# Patient Record
Sex: MALE | Race: WHITE | Employment: OTHER | URBAN - METROPOLITAN AREA
[De-identification: names, ages, dates, MRNs, and addresses within clinical notes are randomized per-mention and may not be internally consistent; named-entity substitution may affect disease eponyms.]

---

## 2022-02-08 ENCOUNTER — OFFICE VISIT (OUTPATIENT)
Dept: SURGERY | Facility: CLINIC | Age: 62
End: 2022-02-08
Payer: COMMERCIAL

## 2022-02-08 VITALS — TEMPERATURE: 97 F

## 2022-02-08 DIAGNOSIS — K64.2 PROLAPSED INTERNAL HEMORRHOIDS, GRADE 3: ICD-10-CM

## 2022-02-08 DIAGNOSIS — L29.0 PRURITUS ANI: ICD-10-CM

## 2022-02-08 DIAGNOSIS — K60.1 CHRONIC POSTERIOR ANAL FISSURE: Primary | ICD-10-CM

## 2022-02-08 PROCEDURE — 99203 OFFICE O/P NEW LOW 30 MIN: CPT | Performed by: COLON & RECTAL SURGERY

## 2022-02-09 NOTE — PATIENT INSTRUCTIONS
This patient complains of anal pain, has been diagnosed with an anal fissure by other physicians, and is here seeking an opinion. I am seeing him in the role of a colon and rectal surgeon. The patient states he was on a fishing trip last summer, had a very large constipated bowel movement, felt the tear in the anus. He did not correlate this with the event of the anal fissure. He is quoted that as a episode of hemorrhoids in July 2021. The patient has had severe symptoms since October 2021. He has burning, itching, feels a ripping sensation. He gets pain that is 5/10 with every bowel movement. He even has pain sometimes beyond the time of defecation. He feels no obstruction defecation. He has 2-3 soft bowel movements per day. He does take Metamucil twice daily. He states when the pain is bad it radiates out to his left buttock and hip. The patient did undergo colonoscopy on December 14, 2021. He was noted to have internal hemorrhoids, and a fissure. He also brought pictures that show that he has hypertrophied anal papillae as well. The hemorrhoids appear to me to be grade 3. The patient has no current significant bright red blood per rectum or melena. He has no mucus in the stool. He has no narrowing of the stool. He has no abdominal pain or distention. He has not suffered weight loss as a symptom. He himself has never had polyps or colon cancer. He has no family history of colon cancer. He has never had a previous anal or rectal abscess, anal fistula, or prior surgery for hemorrhoids. The patient has no history of Crohn disease, ulcerative colitis, or irritable bowel syndrome. The patient is not on any blood thinners, or aspirin. He has never had heart attack, stroke, heart valve replacement, heart murmur, or cardiac arrhythmia. The previous surgeon started him on diltiazem mixed with lidocaine, with no success and healing the fissure.     The previous surgeon was recommending a lateral internal sphincterotomy. Clinical exam today limited to digital rectal exam and external exam reveals him to have a posterior midline anal fissure that has a deep punched out ulcer exposing internal sphincter muscle. There is an associated external tag, and a palpable internal hypertrophied anal papillae. There is significant pruritus ani present. Prostate is normal.  Anal tone reveals a basal tone of 3/4, maximum squeeze pressure 3/4. The patient was instructed on a pruritus ani diet. This will help repair the skin. I went over the diet, as well as other hygienic modifications that will help improve the skin and his itching sensation. We will change him from diltiazem ointment to a nitroglycerin ointment. This needs to be applied 3 times daily externally. Hopefully the combination of the above will cure his very deep punched out posterior midline anal fissure. I will see him again in 1 month. I mentioned to the patient that sometimes skin cancers can mimic fissure. Although his fissure is in a typical location, and looks very typical, I must see it healing prior to signing off his case.

## 2022-03-08 ENCOUNTER — OFFICE VISIT (OUTPATIENT)
Dept: SURGERY | Facility: CLINIC | Age: 62
End: 2022-03-08
Payer: COMMERCIAL

## 2022-03-08 DIAGNOSIS — L29.0 PRURITUS ANI: ICD-10-CM

## 2022-03-08 DIAGNOSIS — K60.0 ACUTE POSTERIOR ANAL FISSURE: ICD-10-CM

## 2022-03-08 DIAGNOSIS — K64.2 PROLAPSED INTERNAL HEMORRHOIDS, GRADE 3: Primary | ICD-10-CM

## 2022-03-08 PROCEDURE — 99214 OFFICE O/P EST MOD 30 MIN: CPT | Performed by: COLON & RECTAL SURGERY

## 2022-03-08 NOTE — PATIENT INSTRUCTIONS
I am seeing this patient in consultation regarding a fissure. We prescribed him nitroglycerin ointment in February. He states the medicine burns, it has had no effect on his fissure. Patient also had pruritus ani. We gave him dietary and hygienic modifications to improve his pruritus symptoms. He states that worked very well. He wants to go back on coffee. The patient states that his fissure kills him every day. He feels like he is being stabbed with a knife in the anus during bowel movements. The patient also mentioned that despite taking Metamucil 3 times daily, he still having a hard stool that is difficult to eliminate. I have advised him to take 1 adult dose of MiraLAX daily. Clinical exam limited to external exam alone reveals him to have a posterior midline anal fissure with an associated anal skin tag. There are some other minimal external hemorrhoids. The pruritus has cleared. There are no signs of fistula or abscess. This patient is up-to-date with colonoscopy within the last year by Dr. Jocelyn Ventura, in [place]. We will be performing a subcutaneous lateral internal sphincterotomy for treatment of his posterior midline anal fissure. I had a long conversation with the patient and his wife regarding the procedure. All risks, benefits, complications and alternatives to the proposed procedure(s) were fully discussed with the patient. All questions from the patient were answered in detail. A description of the procedure(s) and possible outcomes was fully discussed. The patient seemed to understand the conversation and its details. Consent for the procedure(s) was confirmed with the patient.

## 2022-03-14 ENCOUNTER — TELEPHONE (OUTPATIENT)
Dept: SURGERY | Facility: CLINIC | Age: 62
End: 2022-03-14

## 2022-03-15 RX ORDER — ACETAMINOPHEN 325 MG/1
325 TABLET ORAL EVERY 6 HOURS PRN
COMMUNITY
End: 2022-04-04

## 2022-03-15 RX ORDER — FEXOFENADINE HCL 180 MG/1
180 TABLET ORAL DAILY
COMMUNITY
End: 2022-04-04

## 2022-03-15 RX ORDER — CHLORTHALIDONE 25 MG/1
25 TABLET ORAL DAILY
COMMUNITY

## 2022-03-15 RX ORDER — LEVOCETIRIZINE DIHYDROCHLORIDE 5 MG/1
5 TABLET, FILM COATED ORAL EVERY EVENING
COMMUNITY

## 2022-03-15 RX ORDER — MONTELUKAST SODIUM 10 MG/1
10 TABLET ORAL DAILY
COMMUNITY

## 2022-03-16 ENCOUNTER — LABORATORY ENCOUNTER (OUTPATIENT)
Dept: LAB | Age: 62
End: 2022-03-16
Attending: COLON & RECTAL SURGERY
Payer: COMMERCIAL

## 2022-03-16 ENCOUNTER — EKG ENCOUNTER (OUTPATIENT)
Dept: LAB | Age: 62
End: 2022-03-16
Attending: COLON & RECTAL SURGERY
Payer: COMMERCIAL

## 2022-03-16 DIAGNOSIS — Z01.812 ENCOUNTER FOR PREOPERATIVE SCREENING LABORATORY TESTING FOR COVID-19 VIRUS: ICD-10-CM

## 2022-03-16 DIAGNOSIS — K60.2 ANAL FISSURE: ICD-10-CM

## 2022-03-16 DIAGNOSIS — Z20.822 ENCOUNTER FOR PREOPERATIVE SCREENING LABORATORY TESTING FOR COVID-19 VIRUS: ICD-10-CM

## 2022-03-16 LAB
BUN BLD-MCNC: 19 MG/DL (ref 7–18)
CALCIUM BLD-MCNC: 9.6 MG/DL (ref 8.5–10.1)
CHLORIDE SERPL-SCNC: 102 MMOL/L (ref 98–112)
CO2 SERPL-SCNC: 28 MMOL/L (ref 21–32)
CREAT BLD-MCNC: 1.02 MG/DL
FASTING STATUS PATIENT QL REPORTED: NO
GLUCOSE BLD-MCNC: 96 MG/DL (ref 70–99)
OSMOLALITY SERPL CALC.SUM OF ELEC: 288 MOSM/KG (ref 275–295)
POTASSIUM SERPL-SCNC: 3.6 MMOL/L (ref 3.5–5.1)
SODIUM SERPL-SCNC: 138 MMOL/L (ref 136–145)

## 2022-03-16 PROCEDURE — 80048 BASIC METABOLIC PNL TOTAL CA: CPT

## 2022-03-16 PROCEDURE — 93010 ELECTROCARDIOGRAM REPORT: CPT | Performed by: INTERNAL MEDICINE

## 2022-03-16 PROCEDURE — 93005 ELECTROCARDIOGRAM TRACING: CPT

## 2022-03-17 LAB — SARS-COV-2 RNA RESP QL NAA+PROBE: NOT DETECTED

## 2022-03-18 ENCOUNTER — HOSPITAL ENCOUNTER (OUTPATIENT)
Facility: HOSPITAL | Age: 62
Setting detail: HOSPITAL OUTPATIENT SURGERY
Discharge: HOME | End: 2022-03-18
Attending: COLON & RECTAL SURGERY | Admitting: COLON & RECTAL SURGERY
Payer: COMMERCIAL

## 2022-03-18 ENCOUNTER — ANESTHESIA EVENT (OUTPATIENT)
Dept: SURGERY | Facility: HOSPITAL | Age: 62
End: 2022-03-18
Payer: COMMERCIAL

## 2022-03-18 ENCOUNTER — ANESTHESIA (OUTPATIENT)
Dept: SURGERY | Facility: HOSPITAL | Age: 62
End: 2022-03-18
Payer: COMMERCIAL

## 2022-03-18 VITALS
TEMPERATURE: 98 F | OXYGEN SATURATION: 97 % | BODY MASS INDEX: 27.19 KG/M2 | DIASTOLIC BLOOD PRESSURE: 73 MMHG | HEART RATE: 91 BPM | HEIGHT: 71 IN | RESPIRATION RATE: 18 BRPM | SYSTOLIC BLOOD PRESSURE: 125 MMHG | WEIGHT: 194.25 LBS

## 2022-03-18 DIAGNOSIS — K60.0 ACUTE POSTERIOR ANAL FISSURE: ICD-10-CM

## 2022-03-18 DIAGNOSIS — Z20.822 ENCOUNTER FOR PREOPERATIVE SCREENING LABORATORY TESTING FOR COVID-19 VIRUS: Primary | ICD-10-CM

## 2022-03-18 DIAGNOSIS — Z01.812 ENCOUNTER FOR PREOPERATIVE SCREENING LABORATORY TESTING FOR COVID-19 VIRUS: Primary | ICD-10-CM

## 2022-03-18 DIAGNOSIS — K60.2 ANAL FISSURE: ICD-10-CM

## 2022-03-18 PROCEDURE — 88305 TISSUE EXAM BY PATHOLOGIST: CPT | Performed by: COLON & RECTAL SURGERY

## 2022-03-18 PROCEDURE — 0DBQ7ZX EXCISION OF ANUS, VIA NATURAL OR ARTIFICIAL OPENING, DIAGNOSTIC: ICD-10-PCS | Performed by: COLON & RECTAL SURGERY

## 2022-03-18 RX ORDER — ACETAMINOPHEN 500 MG
1000 TABLET ORAL ONCE AS NEEDED
Status: CANCELLED | OUTPATIENT
Start: 2022-03-18 | End: 2022-03-18

## 2022-03-18 RX ORDER — SODIUM CHLORIDE, SODIUM LACTATE, POTASSIUM CHLORIDE, CALCIUM CHLORIDE 600; 310; 30; 20 MG/100ML; MG/100ML; MG/100ML; MG/100ML
INJECTION, SOLUTION INTRAVENOUS CONTINUOUS
Status: DISCONTINUED | OUTPATIENT
Start: 2022-03-18 | End: 2022-03-18

## 2022-03-18 RX ORDER — DEXAMETHASONE SODIUM PHOSPHATE 4 MG/ML
VIAL (ML) INJECTION AS NEEDED
Status: DISCONTINUED | OUTPATIENT
Start: 2022-03-18 | End: 2022-03-18 | Stop reason: SURG

## 2022-03-18 RX ORDER — 0.9 % SODIUM CHLORIDE 0.9 %
INTRAVENOUS SOLUTION INTRAVENOUS
Status: DISCONTINUED
Start: 2022-03-18 | End: 2022-03-18

## 2022-03-18 RX ORDER — HYDROCODONE BITARTRATE AND ACETAMINOPHEN 5; 325 MG/1; MG/1
2 TABLET ORAL AS NEEDED
Status: DISCONTINUED | OUTPATIENT
Start: 2022-03-18 | End: 2022-03-18

## 2022-03-18 RX ORDER — HYDROCODONE BITARTRATE AND ACETAMINOPHEN 5; 325 MG/1; MG/1
1 TABLET ORAL AS NEEDED
Status: DISCONTINUED | OUTPATIENT
Start: 2022-03-18 | End: 2022-03-18

## 2022-03-18 RX ORDER — SODIUM CHLORIDE, SODIUM LACTATE, POTASSIUM CHLORIDE, CALCIUM CHLORIDE 600; 310; 30; 20 MG/100ML; MG/100ML; MG/100ML; MG/100ML
INJECTION, SOLUTION INTRAVENOUS CONTINUOUS
Status: CANCELLED | OUTPATIENT
Start: 2022-03-18

## 2022-03-18 RX ORDER — HYDROCODONE BITARTRATE AND ACETAMINOPHEN 5; 325 MG/1; MG/1
2 TABLET ORAL AS NEEDED
Status: CANCELLED | OUTPATIENT
Start: 2022-03-18

## 2022-03-18 RX ORDER — MEPERIDINE HYDROCHLORIDE 25 MG/ML
12.5 INJECTION INTRAMUSCULAR; INTRAVENOUS; SUBCUTANEOUS AS NEEDED
Status: CANCELLED | OUTPATIENT
Start: 2022-03-18

## 2022-03-18 RX ORDER — HYDROMORPHONE HYDROCHLORIDE 1 MG/ML
0.4 INJECTION, SOLUTION INTRAMUSCULAR; INTRAVENOUS; SUBCUTANEOUS EVERY 5 MIN PRN
Status: DISCONTINUED | OUTPATIENT
Start: 2022-03-18 | End: 2022-03-18

## 2022-03-18 RX ORDER — HYDROCODONE BITARTRATE AND ACETAMINOPHEN 5; 325 MG/1; MG/1
1 TABLET ORAL EVERY 6 HOURS PRN
Qty: 10 TABLET | Refills: 0 | Status: SHIPPED | OUTPATIENT
Start: 2022-03-18 | End: 2022-04-04

## 2022-03-18 RX ORDER — HEPARIN SODIUM 5000 [USP'U]/ML
INJECTION, SOLUTION INTRAVENOUS; SUBCUTANEOUS
Status: DISCONTINUED
Start: 2022-03-18 | End: 2022-03-18

## 2022-03-18 RX ORDER — ACETAMINOPHEN 500 MG
1000 TABLET ORAL ONCE
Status: DISCONTINUED | OUTPATIENT
Start: 2022-03-18 | End: 2022-03-18 | Stop reason: HOSPADM

## 2022-03-18 RX ORDER — ONDANSETRON 2 MG/ML
4 INJECTION INTRAMUSCULAR; INTRAVENOUS AS NEEDED
Status: DISCONTINUED | OUTPATIENT
Start: 2022-03-18 | End: 2022-03-18

## 2022-03-18 RX ORDER — ONDANSETRON 2 MG/ML
4 INJECTION INTRAMUSCULAR; INTRAVENOUS AS NEEDED
Status: CANCELLED | OUTPATIENT
Start: 2022-03-18 | End: 2022-03-19

## 2022-03-18 RX ORDER — NALOXONE HYDROCHLORIDE 0.4 MG/ML
80 INJECTION, SOLUTION INTRAMUSCULAR; INTRAVENOUS; SUBCUTANEOUS AS NEEDED
Status: DISCONTINUED | OUTPATIENT
Start: 2022-03-18 | End: 2022-03-18

## 2022-03-18 RX ORDER — HEPARIN SODIUM 5000 [USP'U]/ML
5000 INJECTION, SOLUTION INTRAVENOUS; SUBCUTANEOUS ONCE
Status: COMPLETED | OUTPATIENT
Start: 2022-03-18 | End: 2022-03-18

## 2022-03-18 RX ORDER — BUPIVACAINE HYDROCHLORIDE AND EPINEPHRINE 5; 5 MG/ML; UG/ML
INJECTION, SOLUTION EPIDURAL; INTRACAUDAL; PERINEURAL AS NEEDED
Status: DISCONTINUED | OUTPATIENT
Start: 2022-03-18 | End: 2022-03-18 | Stop reason: HOSPADM

## 2022-03-18 RX ORDER — CEFOXITIN 2 G/1
INJECTION, POWDER, FOR SOLUTION INTRAVENOUS
Status: DISCONTINUED
Start: 2022-03-18 | End: 2022-03-18

## 2022-03-18 RX ORDER — METOCLOPRAMIDE HYDROCHLORIDE 5 MG/ML
10 INJECTION INTRAMUSCULAR; INTRAVENOUS AS NEEDED
Status: CANCELLED | OUTPATIENT
Start: 2022-03-18 | End: 2022-03-19

## 2022-03-18 RX ORDER — DIPHENHYDRAMINE HYDROCHLORIDE 50 MG/ML
12.5 INJECTION INTRAMUSCULAR; INTRAVENOUS AS NEEDED
Status: CANCELLED | OUTPATIENT
Start: 2022-03-18 | End: 2022-03-19

## 2022-03-18 RX ORDER — HYDROMORPHONE HYDROCHLORIDE 1 MG/ML
0.4 INJECTION, SOLUTION INTRAMUSCULAR; INTRAVENOUS; SUBCUTANEOUS EVERY 5 MIN PRN
Status: CANCELLED | OUTPATIENT
Start: 2022-03-18 | End: 2022-03-19

## 2022-03-18 RX ORDER — NALOXONE HYDROCHLORIDE 0.4 MG/ML
80 INJECTION, SOLUTION INTRAMUSCULAR; INTRAVENOUS; SUBCUTANEOUS AS NEEDED
Status: CANCELLED | OUTPATIENT
Start: 2022-03-18 | End: 2022-03-19

## 2022-03-18 RX ORDER — MIDAZOLAM HYDROCHLORIDE 1 MG/ML
1 INJECTION INTRAMUSCULAR; INTRAVENOUS EVERY 5 MIN PRN
Status: CANCELLED | OUTPATIENT
Start: 2022-03-18 | End: 2022-03-19

## 2022-03-18 RX ORDER — HYDROCODONE BITARTRATE AND ACETAMINOPHEN 5; 325 MG/1; MG/1
1 TABLET ORAL AS NEEDED
Status: CANCELLED | OUTPATIENT
Start: 2022-03-18

## 2022-03-18 RX ORDER — LIDOCAINE HYDROCHLORIDE 10 MG/ML
INJECTION, SOLUTION EPIDURAL; INFILTRATION; INTRACAUDAL; PERINEURAL AS NEEDED
Status: DISCONTINUED | OUTPATIENT
Start: 2022-03-18 | End: 2022-03-18 | Stop reason: SURG

## 2022-03-18 RX ORDER — ONDANSETRON 2 MG/ML
INJECTION INTRAMUSCULAR; INTRAVENOUS AS NEEDED
Status: DISCONTINUED | OUTPATIENT
Start: 2022-03-18 | End: 2022-03-18 | Stop reason: SURG

## 2022-03-18 RX ADMIN — SODIUM CHLORIDE, SODIUM LACTATE, POTASSIUM CHLORIDE, CALCIUM CHLORIDE: 600; 310; 30; 20 INJECTION, SOLUTION INTRAVENOUS at 19:16:00

## 2022-03-18 RX ADMIN — DEXAMETHASONE SODIUM PHOSPHATE 4 MG: 4 MG/ML VIAL (ML) INJECTION at 18:45:00

## 2022-03-18 RX ADMIN — ONDANSETRON 4 MG: 2 INJECTION INTRAMUSCULAR; INTRAVENOUS at 18:45:00

## 2022-03-18 RX ADMIN — SODIUM CHLORIDE, SODIUM LACTATE, POTASSIUM CHLORIDE, CALCIUM CHLORIDE: 600; 310; 30; 20 INJECTION, SOLUTION INTRAVENOUS at 18:45:00

## 2022-03-18 RX ADMIN — LIDOCAINE HYDROCHLORIDE 50 MG: 10 INJECTION, SOLUTION EPIDURAL; INFILTRATION; INTRACAUDAL; PERINEURAL at 18:45:00

## 2022-03-18 NOTE — ANESTHESIA PROCEDURE NOTES
Airway  Date/Time: 3/18/2022 6:46 PM  Urgency: elective      General Information and Staff    Patient location during procedure: OR  Anesthesiologist: Ragini Rubi MD  Performed: anesthesiologist     Indications and Patient Condition  Indications for airway management: anesthesia  Sedation level: deep  Preoxygenated: yes  Patient position: sniffing  Mask difficulty assessment: 1 - vent by mask    Final Airway Details  Final airway type: endotracheal airway      Successful airway: ETT  Cuffed: yes   Successful intubation technique: direct laryngoscopy  Endotracheal tube insertion site: oral  Blade: Magana  Blade size: #2  ETT size (mm): 7.0    Cormack-Lehane Classification: grade IIA - partial view of glottis  Placement verified by: chest auscultation and capnometry   Measured from: lips  Number of attempts at approach: 1

## 2022-03-19 NOTE — OPERATIVE REPORT
[facility]  Op Note    Ben Fan Location: OR   [facility] [id number] MRN [id number]   Admission Date 3/18/2022 Operation Date 3/18/2022   Attending Physician Mayank Baker MD Operating Physician Nehal Traylor MD     Pre-Operative Diagnosis: Acute posterior anal fissure [K60.0]    Post-Operative Diagnosis: Same as above    Procedure Performed: Subcutaneous Lateral Internal Sphincterototmy, transanal polypectomy    Surgeon(s) and Role:     Eulis Hodgkins, MD - Primary    Anesthesia: General    History of present illness: This patient has a chronic posterior midline anal fissure that puts him in acute distress. Operative findings: This patient has a very large anal polyp associated with the fissure in the posterior midline. He has some small tags externally, however the polyp is greater than 1 cm. We did resect that and sent to pathology for for this pathologic diagnosis. The patient underwent an uncomplicated subcutaneous lateral internal sphincterotomy. Operative summary:   Following adequate general anesthesia, the patient was placed in the prone position on the operating room table. The hips were flexed in the jackknife position the buttocks were taped apart. The perineum was painted with Betadine paint. Sterile drapes were placed with wide exposure of the anal canal.    We began the procedure by confirming the above listed operative findings. Perianal skin was injected with 0.5% Marcaine with epinephrine. The deep sphincter complex was also injected. A Chowdhury Melodie anoscope was inserted into the anal canal and used to expose the anal fissure. We then turned the anoscope to expose the left lateral aspect of the anal canal. Using an 11 blade scalpel, a small incision was made at the level of the anoderm to cutaneous junction. The blade was passed, flat skin and the subcutaneous tissues.  The blade was then turned toward the internal sphincter and used to divide only the internal sphincter. The blade was withdrawn, pressure was held on the site for approximately 3 minutes. A 3-0 Vicryl suture was used to control further hemorrhage and to close the incision    Once hemostasis was assured, and all counts were correct, we concluded the procedure. The patient was returned to the supine position and taken to recovery room in stable postoperative condition.         Pathologic Specimen: None        EBL: 5 cc               Zaira Araiza MD  3/18/2022  7:21 PM

## 2022-03-19 NOTE — ANESTHESIA POSTPROCEDURE EVALUATION
[address] [facility]:  [facility]   Age/Gender 57year old male MRN [id number]   [facility] SURGERY Attending Marques Mayorga MD   Hosp Day # 0 [facility]       Anesthesia Post-op Note    SUBCUTANEOUS LATERAL INTERNAL SPHINCTEROTOMY, TRANSANAL POLYPECTOMY     Procedure Summary     Date: 03/18/22 Room / Location: [facility] MAIN OR [address] [place] [address] MAIN OR    Anesthesia Start: 1843 Anesthesia Stop: 1924    Procedure: SUBCUTANEOUS LATERAL INTERNAL SPHINCTEROTOMY, TRANSANAL POLYPECTOMY (N/A Anus) Diagnosis:       Acute posterior anal fissure      (Acute posterior anal fissure, ANAL POLYP[K60.0])    Surgeons: Marques Mayorga MD Anesthesiologist: Samaria Watters MD    Anesthesia Type: general ASA Status: 2          Anesthesia Type: general    Vitals Value Taken Time   /77 03/18/22 1924   Temp 97.4 03/18/22 1924   Pulse 82 03/18/22 1924   Resp 16 03/18/22 1924   SpO2 98 03/18/22 1924       Patient Location: PACU    Anesthesia Type: general    Airway Patency: extubated    Postop Pain Control: adequate    Mental Status: preanesthetic baseline    Nausea/Vomiting: none    Cardiopulmonary/Hydration status: stable euvolemic    Complications: no apparent anesthesia related complications    Postop vital signs: stable    Dental Exam: Unchanged from Preop    Patient to be discharged from PACU when criteria met.

## 2022-03-29 LAB
ATRIAL RATE: 71 BPM
P AXIS: 61 DEGREES
P-R INTERVAL: 250 MS
Q-T INTERVAL: 402 MS
QRS DURATION: 90 MS
QTC CALCULATION (BEZET): 436 MS
R AXIS: 10 DEGREES
T AXIS: 15 DEGREES
VENTRICULAR RATE: 71 BPM

## 2022-04-04 ENCOUNTER — OFFICE VISIT (OUTPATIENT)
Dept: SURGERY | Facility: CLINIC | Age: 62
End: 2022-04-04

## 2022-04-04 VITALS — DIASTOLIC BLOOD PRESSURE: 72 MMHG | SYSTOLIC BLOOD PRESSURE: 113 MMHG | HEART RATE: 73 BPM | TEMPERATURE: 97 F

## 2022-04-04 DIAGNOSIS — K60.0 ACUTE POSTERIOR ANAL FISSURE: Primary | ICD-10-CM

## 2022-04-04 PROCEDURE — 99024 POSTOP FOLLOW-UP VISIT: CPT

## 2022-04-04 PROCEDURE — 3078F DIAST BP <80 MM HG: CPT

## 2022-04-04 PROCEDURE — 3074F SYST BP LT 130 MM HG: CPT

## 2022-04-04 RX ORDER — IBUPROFEN 200 MG
200 TABLET ORAL EVERY 6 HOURS PRN
COMMUNITY

## 2022-04-04 NOTE — PATIENT INSTRUCTIONS
The patient presents for continued care and evaluation following a Subcutaneous Lateral Internal Sphincterototmy, transanal polypectomy on 3/18/2022. The patient states he has been doing well since his operation. He states he had some blood in his bowel movements for the first 2 weeks following his operation, but he is no longer having blood in his stool. The patient states he was also having increased bleeding with pivoting and exertion so he has been taking things lately. Patient states his rectal pain has significantly improved since his operation. The patient states he was taking 1 scoop of MiraLAX daily, but has decreased his dosage to 3/4 a scoop because he was having looser bowel movements. Patient denies any constipation. Clinical exam reveals no external fissures, fistula or abscesses. He has a posterior midline skin tag. Digital rectal exam reveals no palpable masses. Prostate is normal.  His posterior midline anal fissure has a thin layer of epithelium formed over it, but is not yet completely healed. Basal tone 4/4 and maximum squeeze pressure 4/4. The patient is doing well status post Subcutaneous Lateral Internal Sphincterototmy, transanal polypectomy on 3/18/2022. I took the opportunity at this appointment to discuss the pathology with the patient which revealed fibroepithelial polyp. I discussed with the patient that they should refrain from heavy exertion and lifting while he continues to have pain and bleeding from the rectum. The patient may resume activity as tolerated. He may eat a general diet. I recommended decreasing his MiraLAX dosage to half a scoop daily due to his frequency of looser bowel movements. The patient may take Tylenol and ibuprofen as needed for pain. The patient will follow up in 2 weeks to evaluate whether his fissure has completely healed. All of the patient's questions were answered.   The patient verbalized understanding and agreement with the plan of care. I have no further follow-up scheduled with this patient at this time. This patient can see me or Dr. Leslie Ricardo on an as-needed basis. This patient should return urgently for any problems or complications related to the surgical intervention.

## 2022-04-25 ENCOUNTER — OFFICE VISIT (OUTPATIENT)
Dept: SURGERY | Facility: CLINIC | Age: 62
End: 2022-04-25

## 2022-04-25 VITALS
HEART RATE: 74 BPM | BODY MASS INDEX: 27.16 KG/M2 | WEIGHT: 194 LBS | TEMPERATURE: 97 F | DIASTOLIC BLOOD PRESSURE: 85 MMHG | HEIGHT: 71 IN | SYSTOLIC BLOOD PRESSURE: 120 MMHG

## 2022-04-25 DIAGNOSIS — K60.1 CHRONIC POSTERIOR ANAL FISSURE: Primary | ICD-10-CM

## 2022-04-25 PROCEDURE — 3079F DIAST BP 80-89 MM HG: CPT

## 2022-04-25 PROCEDURE — 3008F BODY MASS INDEX DOCD: CPT

## 2022-04-25 PROCEDURE — 99024 POSTOP FOLLOW-UP VISIT: CPT

## 2022-04-25 PROCEDURE — 3074F SYST BP LT 130 MM HG: CPT

## 2022-04-25 NOTE — PATIENT INSTRUCTIONS
The patient presents for continued care and evaluation following a Subcutaneous Lateral Internal Sphincterototmy, transanal polypectomy on 3/18/2022. The patient states he has been doing well since his last postop visit. He states he rarely has any rectal pain. He states he has occasional left-sided anal pain with activity, but the pain is tolerable. He has a regular appetite. He states he is currently taking a half a scoop of MiraLAX daily. He states he has occasional diarrhea on this regimen. Clinical exam of the anus reveals a left lateral external prolapsed hemorrhoid. He has no fistulae or abscesses. Digital rectal exam reveals no palpable masses. Prostate is normal.  His fissure is about 95% healed. Basal tone 4/4 and maximum squeeze pressure 4/4. The patient is doing well following a subcutaneous lateral internal sphincterotomy and transanal polypectomy on 3/18/2022. I recommended the patient decrease his MiraLAX dosing to half a scoop every other day since he continues to have occasional diarrhea. He may resume all other normal activity. I have no further follow-up scheduled with this patient at this time. This patient can see me or Dr. Nelia Beard on an as-needed basis. This patient should return urgently for any problems or complications related to the surgical intervention.

## 2024-12-30 ENCOUNTER — HOSPITAL ENCOUNTER (OUTPATIENT)
Dept: NUCLEAR MEDICINE | Age: 64
Discharge: HOME | End: 2024-12-30
Attending: INTERNAL MEDICINE

## 2024-12-30 DIAGNOSIS — I42.9 CARDIOMYOPATHY (CMD): ICD-10-CM

## 2024-12-30 PROCEDURE — 10006150 HB RX 343: Performed by: INTERNAL MEDICINE

## 2024-12-30 PROCEDURE — A9500 TC99M SESTAMIBI: HCPCS | Performed by: INTERNAL MEDICINE

## 2024-12-30 RX ORDER — TETRAKIS(2-METHOXYISOBUTYLISOCYANIDE)COPPER(I) TETRAFLUOROBORATE 1 MG/ML
10.8 INJECTION, POWDER, LYOPHILIZED, FOR SOLUTION INTRAVENOUS ONCE
Status: COMPLETED | OUTPATIENT
Start: 2024-12-30 | End: 2024-12-30

## 2024-12-30 RX ADMIN — TETRAKIS(2-METHOXYISOBUTYLISOCYANIDE)COPPER(I) TETRAFLUOROBORATE 10.8 MILLICURIE: 1 INJECTION, POWDER, LYOPHILIZED, FOR SOLUTION INTRAVENOUS at 09:29

## 2024-12-31 ENCOUNTER — HOSPITAL ENCOUNTER (OUTPATIENT)
Dept: PET IMAGING | Age: 64
Discharge: HOME | End: 2024-12-31
Attending: INTERNAL MEDICINE

## 2024-12-31 DIAGNOSIS — D86.9 SARCOIDOSIS: ICD-10-CM

## 2024-12-31 LAB — GLUCOSE BLDC GLUCOMTR-MCNC: 101 MG/DL (ref 70–99)

## 2024-12-31 PROCEDURE — 78429 MYOCRD IMG PET 1 STD W/CT: CPT

## 2024-12-31 PROCEDURE — 82962 GLUCOSE BLOOD TEST: CPT

## 2024-12-31 PROCEDURE — A9552 F18 FDG: HCPCS | Performed by: INTERNAL MEDICINE

## 2024-12-31 PROCEDURE — 10006150 HB RX 343: Performed by: INTERNAL MEDICINE

## 2024-12-31 RX ADMIN — FLUDEOXYGLUCOSE F-18 13.61 MILLICURIE: 300 INJECTION INTRAVENOUS at 10:30

## 2025-04-15 ASSESSMENT — RHEUMATOLOGY NEW PATIENT QUESTIONNAIRE
SWOLLEN LEGS OR FEET: N
HOARSE VOICE: N
UNUSUAL FATIGUE: N
FAINTING: N
DIFFICULTY SWALLOWING: N
SKIN REDNESS: N
HEARTBURN OR REFLUX: N
AGITATION: N
EASY BRUISING: N
EYE DRYNESS: N
NIGHT SWEATS: N
HOW WOULD YOU DESCRIBE YOUR STIFFNESS ON AVERAGE: MODERATE
ANEMIA: N
NODULES/BUMPS: N
EXCESSIVE HAIR LOSS (MORE THAN YOUR NORM): N
PAIN OR BURNING ON URINATION: N
SORES IN MOUTH OR NOSE: N
EYE REDNESS: N
JAUNDICE: N
SKIN TIGHTNESS: N
DIFFICULTY STAYING ASLEEP: N
BLOOD IN STOOLS: N
COLOR CHANGES OF HANDS OR FEET IN THE COLD: N
DEPRESSION: N
LOSS OF VISION: N
VOMITING OF BLOOD OR COFFEE GROUND CONSISTENCY MATERIAL: N
EYE PAIN: N
BEHAVIORAL CHANGES: N
CHEST PAIN: N
DOUBLE OR BLURRED VISION: N
COUGH: N
MEMORY LOSS: N
RASH OR ULCERS: N
PERSISTENT DIARRHEA: N
RASH: N
INCREASED SUSCEPTIBILITY TO INFECTION: N
SWOLLEN OR TENDER GLANDS: N
MORNING STIFFNESS: Y
FEVER: N
SEIZURES: N
BLACK STOOLS: N
NAUSEA: N
UNUSUAL BLEEDING: N
LOSS OF CONSCIOUSNESS: N
HEADACHES: N
ABNORMAL URINE: N
SUN SENSITIVE (SUN ALLERGY): N
UNEXPLAINED HEARING LOSS: N
DIFFICULTY FALLING ASLEEP: N
UNEXPLAINED WEIGHT CHANGE: N
UNUSUALLY RAPID OR SLOWED HEART RATE: N
ANXIETY: N
EASILY LOSING TEMPER: N
STOMACH PAIN: N
DIFFICULTY BREATHING LYING DOWN: N
DRYNESS OF MOUTH: N
SHORTNESS OF BREATH: N
NUMBNESS OR TINGLING IN HANDS OR FEET: N

## 2025-04-17 ENCOUNTER — APPOINTMENT (OUTPATIENT)
Dept: RHEUMATOLOGY | Age: 65
End: 2025-04-17

## 2025-04-17 VITALS
DIASTOLIC BLOOD PRESSURE: 79 MMHG | SYSTOLIC BLOOD PRESSURE: 134 MMHG | WEIGHT: 210.1 LBS | HEIGHT: 70 IN | BODY MASS INDEX: 30.08 KG/M2 | HEART RATE: 80 BPM

## 2025-04-17 DIAGNOSIS — M15.0 PRIMARY OSTEOARTHRITIS INVOLVING MULTIPLE JOINTS: ICD-10-CM

## 2025-04-17 DIAGNOSIS — Q24.9 CARDIAC ABNORMALITY (CMD): Primary | ICD-10-CM

## 2025-04-22 ENCOUNTER — EXTERNAL LAB (OUTPATIENT)
Dept: HEALTH INFORMATION MANAGEMENT | Facility: OTHER | Age: 65
End: 2025-04-22

## 2025-04-22 LAB
ALBUMIN SERPL-MCNC: 4.6 G/DL (ref 3.6–5.1)
ALBUMIN/GLOB SERPL: 1.8 (CALC) (ref 1–2.5)
ALP SERPL-CCNC: 68 U/L (ref 35–144)
ALT SERPL-CCNC: 25 U/L (ref 9–46)
AST SERPL-CCNC: 24 U/L (ref 10–35)
BILIRUB SERPL-MCNC: 0.8 MG/DL (ref 0.2–1.2)
BUN SERPL-MCNC: 17 MG/DL (ref 7–25)
BUN/CREAT SERPL: ABNORMAL (CALC) (ref 6–22)
CALCIUM SERPL-MCNC: 10 MG/DL (ref 8.6–10.3)
CHLORIDE SERPL-SCNC: 101 MMOL/L (ref 98–1110)
CO2 SERPL-SCNC: 29 MMOL/L (ref 20–32)
CREAT SERPL-MCNC: 0.99 MG/DL (ref 0.7–1.35)
GFR SERPLBLD SCHWARTZ-ARVRAT: 84 ML/MIN/1.73M2
GLOBULIN SER-MCNC: 2.5 G/DL (CALC) (ref 1.9–3.7)
GLUCOSE SERPL-MCNC: 100 MG/DL (ref 65–99)
LAB RESULT: NORMAL
LENGTH OF FAST TIME PATIENT: ABNORMAL H
POTASSIUM SERPL-SCNC: 3.5 MMOL/L (ref 3.5–5.3)
PROT SERPL-MCNC: 7.1 G/DL (ref 6.1–8.1)
SODIUM SERPL-SCNC: 137 MMOL/L (ref 135–146)

## 2025-06-05 ENCOUNTER — APPOINTMENT (OUTPATIENT)
Dept: RHEUMATOLOGY | Age: 65
End: 2025-06-05

## 2025-07-02 ENCOUNTER — HOSPITAL ENCOUNTER (OUTPATIENT)
Age: 65
Discharge: HOME | End: 2025-07-02
Attending: INTERNAL MEDICINE

## 2025-07-02 DIAGNOSIS — R93.89 ABNORMAL CT OF THE CHEST: ICD-10-CM

## 2025-07-02 PROCEDURE — 71250 CT THORAX DX C-: CPT

## (undated) DEVICE — PAD ULNAR NERVE PROTECTOR

## (undated) DEVICE — SUTURE PROLENE 0 CT-2

## (undated) DEVICE — SCD SLEEVE KNEE HI BLEND

## (undated) DEVICE — SOL  .9 1000ML BTL

## (undated) DEVICE — VIOLET BRAIDED (POLYGLACTIN 910), SYNTHETIC ABSORBABLE SUTURE: Brand: COATED VICRYL

## (undated) DEVICE — BETADINE SOLUTION 8 OZ BTL

## (undated) DEVICE — RECTAL CDS-LF: Brand: MEDLINE INDUSTRIES, INC.

## (undated) DEVICE — #11 STERILE BLADE: Brand: POLYMER COATED BLADES

## (undated) DEVICE — STERILE POLYISOPRENE POWDER-FREE SURGICAL GLOVES: Brand: PROTEXIS